# Patient Record
Sex: FEMALE | Race: WHITE | URBAN - METROPOLITAN AREA
[De-identification: names, ages, dates, MRNs, and addresses within clinical notes are randomized per-mention and may not be internally consistent; named-entity substitution may affect disease eponyms.]

---

## 2017-10-18 NOTE — PATIENT DISCUSSION
IOP stable, VF unreadable due to high false positive and false negative errors. Continue Timolol QD OD and BID OS.

## 2019-12-27 ENCOUNTER — IMPORTED ENCOUNTER (OUTPATIENT)
Dept: URBAN - METROPOLITAN AREA CLINIC 50 | Facility: CLINIC | Age: 34
End: 2019-12-27

## 2021-04-17 ASSESSMENT — TONOMETRY
OS_IOP_MMHG: 13
OD_IOP_MMHG: 15

## 2021-04-17 ASSESSMENT — VISUAL ACUITY: OD_SC: 20/25

## 2021-05-14 NOTE — PATIENT DISCUSSION
No improvement by stopping Timolol. Continue cold compresses and switch to Maxitrol john Qhs OU for 2 weeks.  If no improvement then consult with Dr. Ana Londono team.

## 2021-06-09 NOTE — PATIENT DISCUSSION
No improvement by stopping Timolol. Continue cold compresses and switch to Maxitrol john Qhs OU for 2 weeks.  If no improvement then consult with Dr. Marylene Broccoli team.